# Patient Record
Sex: MALE | URBAN - METROPOLITAN AREA
[De-identification: names, ages, dates, MRNs, and addresses within clinical notes are randomized per-mention and may not be internally consistent; named-entity substitution may affect disease eponyms.]

---

## 2018-12-01 ENCOUNTER — NURSE TRIAGE (OUTPATIENT)
Dept: ADMINISTRATIVE | Facility: CLINIC | Age: 63
End: 2018-12-01

## 2018-12-01 NOTE — TELEPHONE ENCOUNTER
Reason for Disposition   Message left on identified answering machine.    Protocols used: ST NO CONTACT OR DUPLICATE CONTACT CALL-A-AH    Created New

## 2022-11-09 ENCOUNTER — TELEPHONE (OUTPATIENT)
Dept: FAMILY MEDICINE | Facility: CLINIC | Age: 67
End: 2022-11-09

## 2022-11-09 NOTE — TELEPHONE ENCOUNTER
----- Message from Dacia Moyer, Patient Care Assistant sent at 11/9/2022  1:03 PM CST -----  Type:  Needs Medical Advice    Who Called:  [facility]  Symptoms (please be specific): Radha would like a call back regarding the patient would like to go to a wound care center by [facility] [place] not at [facility]   Would the patient rather a call back or a response via [facility]?  Ca  Best Call Back Number: [phone]  Additional Information: